# Patient Record
Sex: MALE | Race: WHITE | NOT HISPANIC OR LATINO | Employment: OTHER
[De-identification: names, ages, dates, MRNs, and addresses within clinical notes are randomized per-mention and may not be internally consistent; named-entity substitution may affect disease eponyms.]

---

## 2017-04-17 ENCOUNTER — PREPPED CHART (OUTPATIENT)
Age: 47
End: 2017-04-17

## 2018-06-20 ASSESSMENT — TONOMETRY
OD_IOP_MMHG: 14
OS_IOP_MMHG: 14

## 2018-06-20 ASSESSMENT — VISUAL ACUITY
OS_SC: 20/20
OD_SC: 20/20
OS_SC: J5
OD_SC: J8

## 2018-06-25 ENCOUNTER — ESTABLISHED COMPREHENSIVE EXAM (OUTPATIENT)
Age: 48
End: 2018-06-25

## 2018-06-25 DIAGNOSIS — H16.223: ICD-10-CM

## 2018-06-25 DIAGNOSIS — H00.023: ICD-10-CM

## 2018-06-25 DIAGNOSIS — H00.026: ICD-10-CM

## 2018-06-25 PROCEDURE — 92015 DETERMINE REFRACTIVE STATE: CPT

## 2018-06-25 PROCEDURE — 92014 COMPRE OPH EXAM EST PT 1/>: CPT

## 2018-06-25 ASSESSMENT — VISUAL ACUITY
OD_CC: J2
OS_SC: 20/25
OS_CC: J2
OD_SC: J8
OD_SC: 20/25
OS_SC: J8

## 2018-06-25 ASSESSMENT — TONOMETRY
OD_IOP_MMHG: 16
OS_IOP_MMHG: 16

## 2018-09-28 NOTE — PATIENT DISCUSSION
No improvement in best corrected vision with contact lens or the phoropter. Defer LASIK at this time.